# Patient Record
Sex: FEMALE | Race: WHITE
[De-identification: names, ages, dates, MRNs, and addresses within clinical notes are randomized per-mention and may not be internally consistent; named-entity substitution may affect disease eponyms.]

---

## 2017-02-27 NOTE — PCM.PREANE
Preanesthetic Assessment





- ANESTHESIA/TRANSFUSION/FAMILY HX


Anesthesia/Transfusion History: No Prior Transfusion(s), Prior Anesthesia


Type of Anesthesia Reaction: Denies: Allergy, Anesthesia Awareness, Excessive 

Somnolence, Excessive Nausea/Vomiting, Excessive Itching, Excessive Shivering, 

Malignant Hyperthermia, Malignant Hyperthermia, Family History, 

Pseudocholinesterase Deficiency, Pseudocholinesterase Deficiency, Family 

History of, Urinary Retention, Unknown, Other (see below)


Family History of Anesthesia Reaction: No





- REVIEW OF SYSTEMS


Constitutional: Reports: no symptoms


CNS: Reports: no symptoms


Respiratory: Reports: no symptoms (sports induced asthma, symptom free, no MDI 

use in years)


Cardiovascular: Reports: no symptoms


GI: Reports: no symptoms (IBS, celiac disease)


Other: Reports: none





- PHYSICAL ASSESSMENT


Height: 1.57 m


Weight: 55.338 kg


NPO Status Date: 02/27/17


NPO Status Time: 21:00


ASA Class: 2


Mental Status: alert & oriented x3


Dentition: Reports: normal dentition


Thyro-Mental Finger Breadths: 3


Mouth Opening Finger Breadths: 3


ROM/Head Extension: full


Respiratory Status: lungs clear to auscultation bilaterally


Cardiovascular Status: regular rate & rhythm, normal S1, S2, no murmur, blood 

pressure WNL





- ALLERGIES


Allergies/Adverse Reactions: 


 Allergies











Allergy/AdvReac Type Severity Reaction Status Date / Time


 


amoxicillin Allergy  Hives Verified 02/27/17 08:28


 


erythromycin base Allergy  Nausea and Verified 02/27/17 08:28





   Vomiting  


 


Sulfa (Sulfonamide Allergy  Hives Verified 02/27/17 08:28





Antibiotics)     














- ANESTHESIA PLAN


Anesthesia Type Planned: MAC





- ACKNOWLEDGEMENTS


Pt an appropriate candidate for the planned anesthesia: Yes


Alternatives and risks of anesthesia discussed w pt/guardian: Yes


Pt/Guardian understands and agree with anesthesia plan: Yes





PreAnesthesia Questionnaire


HEENT History: Reports: Allergic rhinitis


Respiratory History: Reports: Asthma, Other (see below)


Other Respiratory History: hx sports induced asthma


Gastrointestinal History: Reports: Celiac disease, Irritable bowel syndrome


Other Gastrointestinal History: IBS and celiac disease


Psychiatric History: Reports: Depression





- Past Surgical History


Head Surgeries/Procedures: Reports: None


HEENT Surgical History: Reports: Naso-sinus surgery


GI Surgical History: Reports: Appendectomy, Cholecystectomy, Colonoscopy


Other GI Surgeries/Procedures: hx of colonoscopy and duodenoscopy with bx, 

exploratory laparotomy and exploratory laparoscopy


Other Musculoskeletal Surgeries/Procedures:: surgery to right 5th finger


Other Surgical History Comment: history of laparoscopy and laparotomy





- SUBSTANCE USE


Smoking Status *Q: Never Smoker


Recreational Drug Use History: No





- HOME MEDS


Home Medications: 


 Home Meds





Cetirizine [ZyrTEC] 0.5 tab PO ACDINNER 02/27/17 [History]


Lactobacillus Combination No.4 [Probiotic] 1 tab PO DAILY 02/27/17 [History]


Multivitamin [Multivitamins] 1 tab PO DAILY 02/27/17 [History]











- CURRENT (IN HOUSE) MEDS


Current Meds: 





 Current Medications





Lactated Ringer's (Ringers, Lactated)  1,000 mls @ 125 mls/hr IV ASDIRECTED CHRIS


Sodium Chloride (Saline Flush)  10 ml FLUSH ASDIRECTED PRN


   PRN Reason: Keep Vein Open


Sodium Chloride (Saline Flush)  2.5 ml FLUSH ASDIRECTED PRN


   PRN Reason: Keep Vein Open

## 2017-02-28 NOTE — [id number]
Post Anesthesia Note





- EVALUATION WITHIN 48HRS OF ANESTHETIC


Vital Signs in Normal Range: Yes


Patient Participated in Evaluation: Yes


Respiratory Function Stable: Yes


Airway Patent: Yes


Cardiovascular Function Stable: Yes


Hydration Status Stable: Yes


Pain Control Satisfactory: Yes


Nausea and Vomiting Control Satisfactory: Yes


Mental Status Recovered: Yes





- COMMENTS/OBSERVATIONS


Free Text/Narrative:: 


Pt emotional post procedure. VSS. Denies pain when asked. Pt resting 

comfortably now.

## 2017-02-28 NOTE — PCM.OPNOTE
- General Post-Op/Procedure Note


Date of Surgery/Procedure: 02/28/17


Operative Procedure(s): screening colonoscopy


Findings: 


Normal screening colonoscopy


Pre Op Diagnosis: Family history of colon cancer


Post-Op Diagnosis: same


Anesthesia Technique: MAC


Primary Surgeon: Haydee Henderson


Condition: Good

## 2017-02-28 NOTE — OR
SURGEON:

TATUM POWERS MD

 

DATE OF PROCEDURE:  02/28/2017

 

PREOPERATIVE DIAGNOSIS:

Family history of colon cancer.

 

POSTOPERATIVE DIAGNOSIS:

Family history of colon cancer.

 

PROCEDURE PERFORMED:

Screening colonoscopy.

 

INSTRUMENT USED:

Olympus colonoscope.

 

ANESTHESIA:

MAC.

 

EXTENT OF EXAM:

To the cecum.

 

PREPARATION:

Good.

 

LIMITATIONS:

None.

 

INDICATIONS:

The patient is a 36-year-old female with a family history of early colon cancer.

The patient presents today for a screening colonoscopy.  We discussed the

procedure as well as expected perioperative course.  We discussed the risks of

the procedure including bleeding, infection, or damage to surrounding

structures, including perforation.  The patient verbalized understanding and

wished to proceed.

 

PROCEDURE IN DETAIL:

The patient was brought to the endoscopy suite and placed in a left lateral

decubitus position.  A time-out was completed verifying the patient's name, age,

date of birth, allergies, and procedure to be performed.  Monitored anesthesia

care was induced and continuous oxygen was provided via nasal cannula throughout

the procedure.  After adequate sedation was achieved, a digital rectal was

performed, which was within normal limits.  A well lubricated colonoscope was

then inserted in the rectum and advanced under direct visualization to the level

of the cecum.  The cecum was identified by both visual and anatomic landmarks.

A photograph was taken of the cecal cap as well as with the scope retroflexed

within the cecum.  The scope was then fully withdrawn while examining the color,

texture, anatomy, and integrity mucosa from the cecum to the anal canal.  The

findings were consistent with normal colonic mucosa.  The scope was then brought

into the rectum and retroflexed to allow visualization of the anal canal

opening.  This appeared normal and a photograph was taken.  The scope was then

straightened out and retrieved from the patient.  The procedure was terminated.

The patient was transferred to the recovery room in stable condition.

 

ENDOSCOPIC DIAGNOSIS:

Normal colonoscopy.

 

RECOMMENDATION:

Follow up in clinic in 5 years.

 

 

UBALDO BARRAGAN

DD:  02/28/2017 11:15:01

DT:  02/28/2017 18:47:48

Job #:  [id number]

## 2018-05-29 ENCOUNTER — HOSPITAL ENCOUNTER (INPATIENT)
Dept: HOSPITAL 41 - JD.OB | Age: 38
LOS: 1 days | Discharge: HOME | DRG: 513 | End: 2018-05-30
Attending: OBSTETRICS & GYNECOLOGY | Admitting: OBSTETRICS & GYNECOLOGY
Payer: COMMERCIAL

## 2018-05-29 DIAGNOSIS — Z79.899: ICD-10-CM

## 2018-05-29 DIAGNOSIS — D25.9: ICD-10-CM

## 2018-05-29 DIAGNOSIS — N80.1: ICD-10-CM

## 2018-05-29 DIAGNOSIS — N73.6: ICD-10-CM

## 2018-05-29 DIAGNOSIS — Z80.0: ICD-10-CM

## 2018-05-29 DIAGNOSIS — J45.909: ICD-10-CM

## 2018-05-29 DIAGNOSIS — K58.9: ICD-10-CM

## 2018-05-29 DIAGNOSIS — Z88.2: ICD-10-CM

## 2018-05-29 DIAGNOSIS — N80.3: Primary | ICD-10-CM

## 2018-05-29 PROCEDURE — 0UT90ZZ RESECTION OF UTERUS, OPEN APPROACH: ICD-10-PCS | Performed by: OBSTETRICS & GYNECOLOGY

## 2018-05-29 PROCEDURE — 0UT20ZZ RESECTION OF BILATERAL OVARIES, OPEN APPROACH: ICD-10-PCS | Performed by: OBSTETRICS & GYNECOLOGY

## 2018-05-29 PROCEDURE — 0UT70ZZ RESECTION OF BILATERAL FALLOPIAN TUBES, OPEN APPROACH: ICD-10-PCS | Performed by: OBSTETRICS & GYNECOLOGY

## 2018-05-29 RX ADMIN — SODIUM CHLORIDE PRN MG: 9 INJECTION, SOLUTION INTRAVENOUS at 19:00

## 2018-05-29 RX ADMIN — OXYCODONE HYDROCHLORIDE AND ACETAMINOPHEN PRN TAB: 5; 325 TABLET ORAL at 14:38

## 2018-05-29 RX ADMIN — OXYCODONE HYDROCHLORIDE AND ACETAMINOPHEN PRN TAB: 5; 325 TABLET ORAL at 19:00

## 2018-05-29 RX ADMIN — SODIUM CHLORIDE PRN MG: 9 INJECTION, SOLUTION INTRAVENOUS at 14:43

## 2018-05-29 NOTE — PCM.POSTAN
POST ANESTHESIA ASSESSMENT





- MENTAL STATUS


Mental Status: Alert, Oriented





- VITAL SIGNS


Pulse Rate: 87


SaO2: 100


Resp Rate: 15


Blood Pressure: 123/67


Temperature: 98.5 F





- RESPIRATORY


Respiratory Status: Respiratory Rate WNL, Airway Patent, O2 Saturation Stable, 

Supplemental Oxygen





- CARDIOVASCULAR


CV Status: Pulse Rate WNL, Blood Pressure Stable





- GASTROINTESTINAL


GI Status: No Symptoms





- PAIN


Pain Score: 8





- POST OP HYDRATION


Hydration Status: Adequate & Stable

## 2018-05-29 NOTE — PCM.PREANE
Preanesthetic Assessment





- Procedure


Proposed Procedure: 





total abdominal hyst with BSO





- Anesthesia/Transfusion/Family Hx


Anesthesia History: Prior Anesthesia Without Reaction


Family History of Anesthesia Reaction: No


Transfusion History: No Prior Transfusion(s)





- Review of Systems


General: No Symptoms


Pulmonary: No Symptoms


Cardiovascular: No Symptoms


Gastrointestinal: No Symptoms


Neurological: No Symptoms


Other: Reports: Easy Bruising, Sinus Problem (chronic sinusitis), Depression





- Physical Assessment


NPO Status Date: 05/28/18


NPO Status Time: 23:45


Pulse: 64


O2 Sat by Pulse Oximetry: 100


Respiratory Rate: 16


Blood Pressure: 113/56


Temperature: 98.7 F


Height: 5 ft 2 in


Weight: 57.652 kg


ASA Class: 1


Mental Status: Alert & Oriented x3


Airway Class: Mallampati = 1


Dentition: Reports: Normal Dentition


Thyro-Mental Finger Breadths: 3


Mouth Opening Finger Breadths: 3


ROM/Head Extension: Full


Lungs: Clear to Auscultation, Normal Respiratory Effort


Cardiovascular: Regular Rate, Regular Rhythm





- Lab


Values: 





 Laboratory Last Values











WBC  7.50 K/mm3 (3.98-10.04)   05/24/18  11:55    


 


RBC  4.37 M/mm3 (3.98-5.22)   05/24/18  11:55    


 


Hgb  13.4 gm/L (11.2-15.7)   05/24/18  11:55    


 


Hct  39.1 % (34.1-44.9)   05/24/18  11:55    


 


MCV  89.5 fl (79.4-94.8)   05/24/18  11:55    


 


MCH  30.7 pg (25.6-32.2)   05/24/18  11:55    


 


MCHC  34.3 g/dl (32.2-35.5)   05/24/18  11:55    


 


RDW Std Deviation  41.1 fL (36.4-46.3)   05/24/18  11:55    


 


Plt Count  351 K/mm3 (182-369)   05/24/18  11:55    


 


MPV  10.4 fl (9.4-12.3)   05/24/18  11:55    


 


Neut % (Auto)  64.1 % (34.0-71.1)   05/24/18  11:55    


 


Lymph % (Auto)  22.8 % (19.3-51.7)   05/24/18  11:55    


 


Mono % (Auto)  10.4 % (4.7-12.5)   05/24/18  11:55    


 


Eos % (Auto)  2.0  (0.7-5.8)   05/24/18  11:55    


 


Baso % (Auto)  0.7 % (0.1-1.2)   05/24/18  11:55    


 


Neut # (Auto)  4.81 K/mm3 (1.56-6.13)   05/24/18  11:55    


 


Lymph # (Auto)  1.71 K/mm3 (1.18-3.74)   05/24/18  11:55    


 


Mono # (Auto)  0.78 K/mm3 (0.24-0.36)  H  05/24/18  11:55    


 


Eos # (Auto)  0.15 K/mm3 (0.04-0.36)   05/24/18  11:55    


 


Baso # (Auto)  0.05 K/mm3 (0.01-0.08)   05/24/18  11:55    


 


Creatinine  0.8 mg/dL (0.55-1.02)   05/24/18  11:55    


 


Est Cr Clr Drug Dosing  TNP   05/24/18  11:55    


 


Estimated GFR (MDRD)  > 60 mL/min (>60)   05/24/18  11:55    


 


Urine Color  Yellow  (Yellow)   05/24/18  11:55    


 


Urine Appearance  Clear  (Clear)   05/24/18  11:55    


 


Urine pH  6.0  (5.0-8.0)   05/24/18  11:55    


 


Ur Specific Gravity  1.025  (1.005-1.030)   05/24/18  11:55    


 


Urine Protein  Negative  (Negative)   05/24/18  11:55    


 


Urine Glucose (UA)  Negative  (Negative)   05/24/18  11:55    


 


Urine Ketones  Negative  (Negative)   05/24/18  11:55    


 


Urine Occult Blood  Negative  (Negative)   05/24/18  11:55    


 


Urine Nitrite  Negative  (Negative)   05/24/18  11:55    


 


Urine Bilirubin  Negative  (Negative)   05/24/18  11:55    


 


Urine Urobilinogen  0.2  (0.2-1.0)   05/24/18  11:55    


 


Ur Leukocyte Esterase  Negative  (Negative)   05/24/18  11:55    


 


Urine RBC  Not seen /hpf (0-5)   05/24/18  11:55    


 


Urine WBC  Not seen /hpf (0-5)   05/24/18  11:55    


 


Ur Epithelial Cells  Not seen /hpf (0-5)   05/24/18  11:55    


 


Urine Bacteria  Not seen /hpf (FEW)   05/24/18  11:55    


 


Urine Mucus  Not seen /hpf (FEW)   05/24/18  11:55    


 


Urine HCG, Qual  Negative  (NEGATIVE)   05/24/18  11:55    














- Allergies


Allergies/Adverse Reactions: 


 Allergies











Allergy/AdvReac Type Severity Reaction Status Date / Time


 


amoxicillin Allergy  Hives Verified 05/25/18 14:47


 


Sulfa (Sulfonamide Allergy  Hives Verified 05/25/18 14:47





Antibiotics)     


 


erythromycin base AdvReac  Nausea and Verified 05/25/18 14:47





   Vomiting  














- Blood


Blood Available: Yes





- Acknowledgements


Anesthesia Type Planned: General Anesthesia


Pt an Appropriate Candidate for the Planned Anesthesia: Yes


Alternatives and Risks of Anesthesia Discussed w Pt/Guardian: Yes


Pt/Guardian Understands and Agrees with Anesthesia Plan: Yes





PreAnesthesia Questionnaire


HEENT History: Reports: Allergic Rhinitis


Cardiovascular History: Reports: None


Respiratory History: Reports: Asthma, Other (See Below)


Other Respiratory History: hx sports induced asthma- no problems now


Gastrointestinal History: Reports: Celiac Disease, Irritable Bowel Syndrome


Other Gastrointestinal History: IBS and celiac disease, gastric ulcer


Genitourinary History: Reports: None


OB/GYN History: Reports: Endometriosis


Musculoskeletal History: Reports: None


Neurological History: Reports: None


Psychiatric History: Reports: Depression


Endocrine/Metabolic History: Reports: None


Hematologic History: Reports: None


Immunologic History: Reports: None


Oncologic (Cancer) History: Reports: None


Dermatologic History: Reports: None





- Past Surgical History


Head Surgeries/Procedures: Reports: None


HEENT Surgical History: Reports: Naso-Sinus Surgery


Cardiovascular Surgical History: Reports: None


GI Surgical History: Reports: Appendectomy, Cholecystectomy, Colonoscopy


Other GI Surgeries/Procedures: hx of colonoscopy and duodenoscopy with bx, 

exploratory laparotomy and exploratory laparoscopy


Female  Surgical History: Reports: None


Male  Surgical History: Reports: None


Endocrine Surgical History: Reports: None


Neurological Surgical History: Reports: None


Musculoskeletal Surgical History: Reports: None


Other Musculoskeletal Surgeries/Procedures:: surgery to right 5th finger


Oncologic Surgical History: Reports: None





- SUBSTANCE USE


Smoking Status *Q: Never Smoker


Tobacco Use Within Last Twelve Months: No


Second Hand Smoke Exposure: No


Days Per Week of Alcohol Use: 0


Recreational Drug Use History: No





- HOME MEDS


Home Medications: 


 Home Meds





Cetirizine [ZyrTEC] 0.5 tab PO ACDINNER 02/27/17 [History]


Lactobacillus Combination No.4 [Probiotic] 1 tab PO DAILY 02/27/17 [History]


Multivitamin [Multivitamins] 1 tab PO DAILY 02/27/17 [History]


Biocleanse 1 tab PO DAILY 05/25/18 [History]


Glucosamine [Glucosamine Sulfate] 1,000 mg PO DAILY 05/25/18 [History]











- CURRENT (IN HOUSE) MEDS


Current Meds: 





 Current Medications





Lactated Ringer's (Ringers, Lactated)  1,000 mls @ 125 mls/hr IV ASDIRECTED ALEXANDRA


   Stop: 05/29/18 23:00


Lidocaine/Sodium Bicarbonate (Buffered Lidocaine 1% In Ns 8.4%)  0.25 ml IDERM 

ONETIME PRN


   PRN Reason: Prior to IV Start


   Stop: 05/29/18 18:00


Sodium Chloride (Saline Flush)  10 ml FLUSH ASDIRECTED PRN


   PRN Reason: Keep Vein Open


   Stop: 05/29/18 18:00





Discontinued Medications





Bupivacaine HCl (Marcaine 0.5%) Confirm Administered Dose 30 ml .ROUTE .STK-MED 

ONE


   Stop: 05/29/18 07:16


Cefazolin Sodium (Ancef) Confirm Administered Dose 2 gm .ROUTE .STK-MED ONE


   Stop: 05/29/18 07:16


Fentanyl (Sublimaze) Confirm Administered Dose 250 mcg .ROUTE .STK-MED ONE


   Stop: 05/29/18 07:16


Lidocaine HCl (Xylocaine-Mpf 1%) Confirm Administered Dose 4 mls @ as directed 

.ROUTE .STK-MED ONE


   Stop: 05/29/18 07:15


Midazolam HCl (Versed 1 Mg/Ml) Confirm Administered Dose 2 mg .ROUTE .STK-MED 

ONE


   Stop: 05/29/18 07:15


Ondansetron HCl (Zofran) Confirm Administered Dose 4 mg .ROUTE .STK-MED ONE


   Stop: 05/29/18 07:15


Propofol (Diprivan  20 Ml) Confirm Administered Dose 200 mg .ROUTE .STK-MED ONE


   Stop: 05/29/18 07:15


Rocuronium Bromide (Zemuron) Confirm Administered Dose 50 mg .ROUTE .STK-MED ONE


   Stop: 05/29/18 07:15

## 2018-05-29 NOTE — PCM.OPNOTE
- General Post-Op/Procedure Note


Date of Surgery/Procedure: 05/29/18


Operative Procedure(s): Total abdominal hysterectomy with bilateral salpingo-

oophorectomy, lysis of pelvic adhesions


Findings: 





Patient appeared to have moderate scarring into the anterior abdominal wall. 

Upon entry into the pelvic cavity patient is noted to have what appears to be 

stage IV endometriosis. She is bilateral ovarian endometriomas, left side 

greater than right in size. Cul-de-sac is completely obliterated. The anterior 

sigmoid colon was adhered to the posterior aspect of the uterus. Fallopian 

tubes bilaterally were very filled and tortuous and adhered to the posterior 

aspect of the uterus or the pelvic sidewall. Anterior cul-de-sac was reasonably 

free of endometriosis and/or adhesions. Uterus was irregularly shaped 

consistent with fibroids which diagnosed preoperatively.


Pre Op Diagnosis: 1. Dysmenorrhea.  2. Dyspareunia.  3. Menorrhagia.  4. 

Endometriosis.  5. Pelvic pain.  6. Uterine fibroids


Post-Op Diagnosis: Same with the addition of severe pelvic adhesions.


Anesthesia Technique: General ET Tube


Other Anesthesia Type: Marcaine 0.5%20 mL local


Primary Surgeon: Juan Moses


Secondary Surgeon: Dereck Navarrete


Anesthesia Provider: Harvey Pittmna


Reason Assistant Was Necessary: 





Retraction, assistance, patient's safety, quality of care


Role of Assistant: 





Retraction and assistance


Fluid Replacement, Intraop: 2,700


Output, Urine Amount: 150


EBL in mLs: 700


Drain/Tube Comments:: Indwelling bladder catheter


Complications: None


Condition: Good


Free Text/Narrative:: 


 Intake & Output











 05/28/18 05/29/18 05/29/18





 22:59 06:59 14:59


 


Intake Total   305


 


Output Total   350


 


Balance   -45








Surgery duration: 82 minutes





Procedure: The patient was taken to the operating room and placed in the supine 

position on the operating table general endotracheal anesthesia was history. 

Patient received 2 g of Ancef preoperatively for infection prophylaxis and 

sequential compression stockings in place for DVT prophylaxis. After adequate 

anesthesia was draped in usual fashion. An indwelling bladder catheter was 

placed.





The anterior abdominal wall was entered through the grand still incision scar. 

This after Marcaine infiltration with 20 mL of 0.5% solutio





 The incision was made through the skin and carried down subcutaneous and 

fascial layers using sharp dissection. The fascial layer was undermined 

superiorly and inferiorly to allow for adequate operating room. Recti muscles 

were  midline and the pre-peritoneal fat pad was bluntly dissected. 

Peritoneal cavity was entered high and without problems.





It was immediately apparent that there were significant adhesions and 

significant endometriosis involved. The sigmoid colon was attached to the 

backside of the uterus. Both ovaries appeared be involved with endometriomas 

left side greater than size and right side. Findings otherwise as described 

above. Initially the sigmoid colon was dissected off the back of the uterus 

without problems. This using sharp and blunt dissection. The bowel was then 

packed well out of the way. Decision was made to remove the uterus initially 

and then work on the adnexa afterwards because of the space-occupying effect of 

the uterus. The upper cornual area, round ligament, round ligament portion of 

the uterus was crossclamped with Reshma clamps on each side. Pedicles were cut 

and suture ligated using #1 Vicryl. The broad ligament and eventually the 

uterine vasculature and cardinal and were taken down in much the same fashion 

bilaterally. The cardinal ligament and the cervix was then freed up using 

straight [place] Reshma clamps. Each pedicle being suture ligated with 

Reshma stitch of #1 Vicryl. The vagina were crossclamped for hemostasis reasons 

and the uterine specimen with cervix intact was removed. These last 2 pedicles 

were suture-ligated using Reshma stitch of 1 Vicryl and the short stance in 

between was closed with a running lock suture of #1 Vicryl in a locked fashion.





Attention was then turned towards the right adnexa. The infundibulopelvic 

ligament was dissected out and clamped and suture ligated. The remaining part 

of the ovary and the fallopian tube were freed up using sharp and blunt 

dissection with the endometrioma been completely removed. It was attached to 

the posterior aspect of the uterus and to the pelvic sidewall. It appeared to 

be well away from the ureter.





The left ovary was then removed in much the same fashion. The endometrioma did 

rupture and this resulted in release of chocolate-appearing fluid. This is well 

irrigated out of the pelvic cavity. After the left ovary and fallopian tube 

were removed, the posterior cul-de-sac had several small bleeders present. 2 

these on the anterior surface of the sigmoid colon were controlled with figure-

of-eight suture of 3-0 Monocryl using a small needle. With pressure and over 

the course of short period time the remainder bleeding is essentially stopped. 

FloSeal was applied to the raw surfaces where the endometriomas had been to 

dissected free from. This to ensure hemostasis.





At this time pelvis was reevaluated and found to be hemostatically intact. 

Remaining sponges were removed from the abdominal cavity and sponge instrument 

needle counts were correct at this point. Anterior abdominal wall was then 

closed. The fascia was closed with #1 PDS from angle to angle. Subcutaneous 

scarring was freed up which allowed reapproximation skin using a 3-0 Monocryl 

suture on a Raymond needle in a running subcuticular fashion. The skin incision 

was further approximated using Prineo mesh.   





At the end of the procedures, sponge instrument needle counts are correct. 

Patient is in good condition. She was awakened from general endotracheal 

anesthesia and left the operating room in good condition.

## 2018-05-30 VITALS — DIASTOLIC BLOOD PRESSURE: 56 MMHG | SYSTOLIC BLOOD PRESSURE: 110 MMHG

## 2018-05-30 RX ADMIN — OXYCODONE HYDROCHLORIDE AND ACETAMINOPHEN PRN TAB: 5; 325 TABLET ORAL at 03:22

## 2018-05-30 RX ADMIN — OXYCODONE HYDROCHLORIDE AND ACETAMINOPHEN PRN TAB: 5; 325 TABLET ORAL at 16:01

## 2018-05-30 RX ADMIN — OXYCODONE HYDROCHLORIDE AND ACETAMINOPHEN PRN TAB: 5; 325 TABLET ORAL at 02:36

## 2018-05-30 RX ADMIN — OXYCODONE HYDROCHLORIDE AND ACETAMINOPHEN PRN TAB: 5; 325 TABLET ORAL at 08:11

## 2018-05-30 NOTE — [id number]
**Discharge Summary





- Hospital Course


Free Text/Narrative:: 





Princess is a 38-year-old nulligravida white female admitted yesterday for total 

abdominal hysterectomy and bilateral salpingo-oophorectomy.Diagnosis was 

endometriosis, dysmenorrhea, dyspareunia, menorrhagia, uterine fibroids, pelvic 

pain.





Total abdominal hysterectomy with bilateral salpingo-oophorectomy was 

performed. She is recovering well. Pain has been under good control with 

morphine sulfate initially followed by Percocet. Patient was started on Motrin 

this a.m. and will be discharged later on in the day. Vital signs and stable. 

Patient is afebrile. Follow-up hemoglobin is 10.9 and platelets of 354,000 this 

morning.





- Discharge Data


Discharge Date: 05/30/18


Discharge Disposition: Home, Self-Care 01


Condition: Good





- Patient Summary/Data


Operative Procedure(s) Performed: Total abdominal hysterectomy with bilateral 

salpingo-oophorectomy, lysis of pelvic adhesions





- Patient Instructions


Diet: Regular Diet as Tolerated


Activity: As Tolerated (No intercourse or tampons until seen back. No lifting 

greater than 15 pounds or driving a car 1 week.)


Driving: Do Not Drive


Showering/Bathing: May Shower


Wound/Incision Care: Keep Operative Site/Wound Site Clean and Dry


Notify Provider of: Fever, Increased Pain, Swelling and Redness, Drainage, 

Nausea and/or Vomiting





- Discharge Plan


Prescriptions/Med Rec: 


Ibuprofen 600 mg PO Q4H PRN #30 tablet


 PRN Reason: Pain


Home Medications: 


 Home Meds





Cetirizine [ZyrTEC] 0.5 tab PO ACDINNER 02/27/17 [History]


Lactobacillus Combination No.4 [Probiotic] 1 tab PO DAILY 02/27/17 [History]


Multivitamin [Multivitamins] 1 tab PO DAILY 02/27/17 [History]


Biocleanse 1 tab PO DAILY 05/25/18 [History]


Glucosamine [Glucosamine Sulfate] 1,000 mg PO DAILY 05/25/18 [History]


Acetaminophen/oxyCODONE [Percocet 325-5 MG] 2 tab PO Q4H PRN  tablet 05/30/18 [

Rx]


Ibuprofen 600 mg PO Q4H PRN #30 tablet 05/30/18 [Rx]








Referrals: 


Juan Moses MD [Physician] -  ([facility]





- Discharge Summary/Plan Comment


DC Time >30 min.: No


Discharge Summary/Plan Comment: 





Discharge instructions:





1. Discharge home





2. Irregular, high fiber diet





3. Routine postoperative activity as outlined with patient and in discharge 

summary





4. Return to clinic in 2 weeksDr. oMses.





5. Call for increased pain, bleeding, temperature, signs/symptoms of DVT or PE 

or signs symptoms of infection.





- Patient Data


Vitals - Most Recent: 


 Last Vital Signs











Temp  37.9 C   05/30/18 04:00


 


Pulse  67   05/30/18 04:00


 


Resp  16   05/30/18 04:00


 


BP  125/72   05/30/18 00:00


 


Pulse Ox  98   05/30/18 04:00











Weight - Most Recent: 57.652 kg


I&O - Last 24 hours: 


 Intake & Output











 05/29/18 05/30/18 05/30/18





 22:59 06:59 14:59


 


Intake Total 1120  


 


Output Total 750 2400 


 


Balance 370 -2400 











Lab Results - Last 24 hrs: 


 Laboratory Results - last 24 hr











  05/29/18 05/30/18 Range/Units





  07:23 05:55 


 


WBC   11.85 H  (3.98-10.04)  K/mm3


 


RBC   3.55 L  (3.98-5.22)  M/mm3


 


Hgb   10.9 L  (11.2-15.7)  gm/L


 


Hct   31.7 L  (34.1-44.9)  %


 


MCV   89.3  (79.4-94.8)  fl


 


MCH   30.7  (25.6-32.2)  pg


 


MCHC   34.4  (32.2-35.5)  g/dl


 


RDW Std Deviation   39.3  (36.4-46.3)  fL


 


Plt Count   354  (182-369)  K/mm3


 


MPV   10.7  (9.4-12.3)  fl


 


Blood Type  A POSITIVE   


 


Gel Antibody Screen  Negative   











Med Orders - Current: 


 Current Medications





Docusate Sodium (Colace)  100 mg PO BID [facility]


   Last Admin: 05/30/18 05:47 Dose:  Not Given


Ibuprofen (Motrin)  600 mg PO Q6H PRN


   PRN Reason: Pain (mild 1-3)


   Last Admin: 05/29/18 17:44 Dose:  600 mg


Morphine Sulfate (Morphine)  1 mg IVPUSH Q2H PRN


   PRN Reason: Pain (severe 7-10)


   Last Admin: 05/30/18 00:03 Dose:  1 mg


Ondansetron HCl (Zofran)  4 mg IVPUSH Q4H PRN


   PRN Reason: Nausea/Vomiting


   Last Admin: 05/29/18 19:00 Dose:  4 mg


Oxycodone/Acetaminophen (Percocet 325-5 Mg)  2 tab PO Q4H PRN


   PRN Reason: Pain (moderate 4-6)


   Last Admin: 05/30/18 03:22 Dose:  1 tab


Estrogens,Conjugated (0.625 Mg Tab.)  0 each PO DAILY ALEXANDRA





Discontinued Medications





Bupivacaine HCl (Marcaine 0.5%) Confirm Administered Dose 30 ml .ROUTE .STK-MED 

ONE


   Stop: 05/29/18 07:16


   Last Admin: 05/29/18 08:14 Dose:  20 ml


Cefazolin Sodium (Ancef) Confirm Administered Dose 2 gm .ROUTE .STK-MED ONE


   Stop: 05/29/18 07:16


Dexamethasone (Dexamethasone) Confirm Administered Dose 20 mg .ROUTE .STK-MED 

ONE


   Stop: 05/29/18 08:01


Fentanyl (Sublimaze) Confirm Administered Dose 250 mcg .ROUTE .STK-MED ONE


   Stop: 05/29/18 07:16


Fentanyl (Sublimaze)  50 mcg IVPUSH Q5M PRN


   PRN Reason: Pain


   Stop: 05/29/18 10:30


Fentanyl (Sublimaze) Confirm Administered Dose 100 mcg .ROUTE .STK-MED ONE


   Stop: 05/29/18 08:45


Hydromorphone HCl (Dilaudid)  0.5 mg IVPUSH ONETIME PRN


   PRN Reason: Pain (severe 7-10)


   Stop: 05/29/18 10:30


   Last Admin: 05/29/18 10:15 Dose:  0.5 mg


Hydromorphone HCl (Dilaudid) Confirm Administered Dose 0.5 mg .ROUTE .STK-MED 

ONE


   Stop: 05/29/18 08:17


Hydromorphone HCl (Dilaudid) Confirm Administered Dose 0.5 mg .ROUTE .STK-MED 

ONE


   Stop: 05/29/18 08:28


Hydromorphone HCl (Dilaudid) Confirm Administered Dose 0.5 mg .ROUTE .STK-MED 

ONE


   Stop: 05/29/18 09:20


Lactated Ringer's (Ringers, Lactated)  1,000 mls @ 125 mls/hr IV ASDIRECTED ALEXANDRA


   Stop: 05/29/18 23:00


   Last Admin: 05/29/18 10:38 Dose:  125 mls/hr


Lidocaine HCl (Xylocaine-Mpf 1%) Confirm Administered Dose 4 mls @ as directed 

.ROUTE .STK-MED ONE


   Stop: 05/29/18 07:15


Lactated Ringer's (Ringers, Lactated) Confirm Administered Dose 1,000 mls @ as 

directed .ROUTE .ST-MED ONE


   Stop: 05/29/18 08:12


Lactated Ringer's (Ringers, Lactated) Confirm Administered Dose 1,000 mls @ as 

directed .ROUTE .[facility]-MED ONE


   Stop: 05/29/18 09:02


Lidocaine/Sodium Bicarbonate (Buffered Lidocaine 1% In Ns 8.4%)  0.25 ml IDERM 

ONETIME PRN


   PRN Reason: Prior to IV Start


   Stop: 05/29/18 18:00


   Last Admin: 05/29/18 07:15 Dose:  0.25 ml


Meperidine HCl (Demerol)  12.5 mg IVPUSH ONETIME PRN


   PRN Reason: shivering


   Stop: 05/29/18 10:30


Midazolam HCl (Versed 1 Mg/Ml) Confirm Administered Dose 2 mg .ROUTE .STK-MED 

ONE


   Stop: 05/29/18 07:15


Ondansetron HCl (Zofran) Confirm Administered Dose 4 mg .ROUTE .ST-MED ONE


   Stop: 05/29/18 07:15


Ondansetron HCl (Zofran)  4 mg IVPUSH ONETIME PRN


   PRN Reason: Nausea/Vomiting


   Stop: 05/29/18 10:30


Propofol (Diprivan  20 Ml) Confirm Administered Dose 200 mg .ROUTE .STK-MED ONE


   Stop: 05/29/18 07:15


Rocuronium Bromide (Zemuron) Confirm Administered Dose 50 mg .ROUTE .STK-MED ONE


   Stop: 05/29/18 07:15


Sodium Chloride (Saline Flush)  10 ml FLUSH ASDIRECTED PRN


   PRN Reason: Keep Vein Open


   Stop: 05/29/18 18:00

## 2018-05-30 NOTE — [id number]
Post Anesthesia Note





- EVALUATION WITHIN 48HRS OF ANESTHETIC


Vital Signs in Normal Range: Yes


Patient Participated in Evaluation: Yes


Respiratory Function Stable: Yes


Airway Patent: Yes


Cardiovascular Function Stable: Yes


Hydration Status Stable: Yes


Pain Control Satisfactory: Yes


Nausea and Vomiting Control Satisfactory: Yes


Mental Status Recovered: Yes


Pulse Rate: 79


Resp Rate: 16


Temperature: 36.9 C


Blood Pressure: 120/66





- COMMENTS/OBSERVATIONS


Free Text/Narrative:: 





no anesthesia complications noted

## 2022-02-25 ENCOUNTER — HOSPITAL ENCOUNTER (OUTPATIENT)
Dept: HOSPITAL 56 - MW.SDS | Age: 42
Discharge: HOME | End: 2022-02-25
Attending: SURGERY
Payer: MEDICAID

## 2022-02-25 VITALS — HEART RATE: 66 BPM | DIASTOLIC BLOOD PRESSURE: 64 MMHG | SYSTOLIC BLOOD PRESSURE: 114 MMHG

## 2022-02-25 DIAGNOSIS — Z87.19: ICD-10-CM

## 2022-02-25 DIAGNOSIS — Z98.890: ICD-10-CM

## 2022-02-25 DIAGNOSIS — K29.50: ICD-10-CM

## 2022-02-25 DIAGNOSIS — Z80.0: ICD-10-CM

## 2022-02-25 DIAGNOSIS — K90.0: ICD-10-CM

## 2022-02-25 DIAGNOSIS — K21.9: ICD-10-CM

## 2022-02-25 DIAGNOSIS — N80.9: ICD-10-CM

## 2022-02-25 DIAGNOSIS — Z90.49: ICD-10-CM

## 2022-02-25 DIAGNOSIS — Z12.11: Primary | ICD-10-CM

## 2022-02-25 DIAGNOSIS — J45.909: ICD-10-CM

## 2022-02-25 DIAGNOSIS — Z90.710: ICD-10-CM

## 2022-02-25 DIAGNOSIS — Z88.2: ICD-10-CM

## 2022-02-25 DIAGNOSIS — K58.9: ICD-10-CM

## 2022-02-25 DIAGNOSIS — F32.A: ICD-10-CM

## 2022-02-25 DIAGNOSIS — Z88.1: ICD-10-CM

## 2022-02-25 PROCEDURE — 43239 EGD BIOPSY SINGLE/MULTIPLE: CPT

## 2022-02-25 PROCEDURE — 45378 DIAGNOSTIC COLONOSCOPY: CPT

## 2022-08-09 ENCOUNTER — HOSPITAL ENCOUNTER (OUTPATIENT)
Dept: HOSPITAL 41 - JD.SDS | Age: 42
Discharge: HOME | End: 2022-08-09
Attending: OBSTETRICS & GYNECOLOGY
Payer: COMMERCIAL

## 2022-08-09 VITALS — SYSTOLIC BLOOD PRESSURE: 108 MMHG | HEART RATE: 60 BPM | DIASTOLIC BLOOD PRESSURE: 66 MMHG

## 2022-08-09 DIAGNOSIS — J45.909: ICD-10-CM

## 2022-08-09 DIAGNOSIS — F32.A: ICD-10-CM

## 2022-08-09 DIAGNOSIS — Z90.49: ICD-10-CM

## 2022-08-09 DIAGNOSIS — Z87.19: ICD-10-CM

## 2022-08-09 DIAGNOSIS — K21.9: ICD-10-CM

## 2022-08-09 DIAGNOSIS — Z79.899: ICD-10-CM

## 2022-08-09 DIAGNOSIS — Z88.1: ICD-10-CM

## 2022-08-09 DIAGNOSIS — Z88.6: ICD-10-CM

## 2022-08-09 DIAGNOSIS — F41.9: ICD-10-CM

## 2022-08-09 DIAGNOSIS — Z88.2: ICD-10-CM

## 2022-08-09 DIAGNOSIS — Z98.890: ICD-10-CM

## 2022-08-09 DIAGNOSIS — K66.0: Primary | ICD-10-CM

## 2022-08-09 DIAGNOSIS — N80.9: ICD-10-CM

## 2022-08-09 DIAGNOSIS — Z90.710: ICD-10-CM

## 2022-08-09 DIAGNOSIS — K58.9: ICD-10-CM

## 2022-08-09 PROCEDURE — 85025 COMPLETE CBC W/AUTO DIFF WBC: CPT

## 2022-08-09 PROCEDURE — 49329 UNLSTD LAPS PX ABD PERTM&OMN: CPT

## 2022-08-09 PROCEDURE — 81003 URINALYSIS AUTO W/O SCOPE: CPT

## 2022-08-09 PROCEDURE — 36415 COLL VENOUS BLD VENIPUNCTURE: CPT
